# Patient Record
Sex: MALE | Race: WHITE | NOT HISPANIC OR LATINO | URBAN - METROPOLITAN AREA
[De-identification: names, ages, dates, MRNs, and addresses within clinical notes are randomized per-mention and may not be internally consistent; named-entity substitution may affect disease eponyms.]

---

## 2018-06-03 ENCOUNTER — EMERGENCY (EMERGENCY)
Facility: HOSPITAL | Age: 58
LOS: 1 days | Discharge: HOME | End: 2018-06-03
Attending: STUDENT IN AN ORGANIZED HEALTH CARE EDUCATION/TRAINING PROGRAM
Payer: COMMERCIAL

## 2018-06-03 VITALS
HEIGHT: 68 IN | SYSTOLIC BLOOD PRESSURE: 132 MMHG | OXYGEN SATURATION: 92 % | TEMPERATURE: 98 F | HEART RATE: 80 BPM | RESPIRATION RATE: 16 BRPM | DIASTOLIC BLOOD PRESSURE: 93 MMHG | WEIGHT: 179.9 LBS

## 2018-06-03 DIAGNOSIS — Z41.9 ENCOUNTER FOR PROCEDURE FOR PURPOSES OTHER THAN REMEDYING HEALTH STATE, UNSPECIFIED: Chronic | ICD-10-CM

## 2018-06-03 PROCEDURE — 99283 EMERGENCY DEPT VISIT LOW MDM: CPT

## 2018-06-03 PROCEDURE — 93005 ELECTROCARDIOGRAM TRACING: CPT

## 2018-06-03 PROCEDURE — 93010 ELECTROCARDIOGRAM REPORT: CPT

## 2018-06-03 PROCEDURE — 99284 EMERGENCY DEPT VISIT MOD MDM: CPT

## 2018-06-03 RX ORDER — OMEPRAZOLE 10 MG/1
0 CAPSULE, DELAYED RELEASE ORAL
Qty: 0 | Refills: 0 | COMMUNITY

## 2018-06-03 RX ORDER — LOSARTAN POTASSIUM 100 MG/1
1 TABLET, FILM COATED ORAL
Qty: 0 | Refills: 0 | COMMUNITY

## 2018-06-03 NOTE — ED ADULT TRIAGE NOTE - CHIEF COMPLAINT QUOTE
Pt BIBA A&ox3, speaking coherently, and following commands. Pt had recent R shoulder surgery 2 weeks ago and was instructed not to lift anything heavy, pt lifted something, heard a pop felt immense pain and became diaphoretic, dizzy, nauseous. As per wife, pt was in and out of consciousness. Pt does not recall this.  on scene.

## 2018-06-03 NOTE — ED ADULT NURSE NOTE - OBJECTIVE STATEMENT
patient states grabbed ice pack with right hand and had sharp pain to right shoulder. patient states had Bicep shoulder on 5/17 in [facility] and wife states that patient passed out twice due to pain.  post bozena radial pulse  finger warm and mobile. patient able to move all joints good cap refill. patient having right arm in shoulder immobilizer patient having three steri strip site to right shoulder all dry and intact

## 2018-06-03 NOTE — ED ADULT NURSE NOTE - CHPI ED SYMPTOMS NEG
no abrasion/no weakness/no tingling/no difficulty bearing weight/no stiffness/no bruising/no fever/no numbness/no deformity/no back pain

## 2018-06-03 NOTE — ED PROVIDER NOTE - MEDICAL DECISION MAKING DETAILS
discussed obtaining lab work for electrolyte abnormality, however pt's sx are likely due to acute vasovagal, due to painful episode, pt is aware, does not want any labs at this time, would like to go home and follow up with orthopedist tomorrow

## 2018-06-03 NOTE — ED PROVIDER NOTE - OBJECTIVE STATEMENT
56 y/o M, with hx of HTN, GERD, and h/o right shoulder surgery on 5/17/18, presents to the ED c/o 56 y/o M, with hx of HTN, GERD, and h/o right shoulder surgery on 5/17/18, presents to the ED c/o right shoulder pain, onset 7:45PM.  Pt states that he was lifting something with his left hand, when the object began to slip.  Pt states he tried to recover the object with his right arm, when he felt a pop followed by severe pain.  Pt states he began to feel very diaphoretic, dizzy, and nauseous.  As per wife, pt began to go in and out of consciousness, with multiple levels of LOC, lasting approximately 30 seconds each.  Pt states that due to recent surgery, he was not suppose to be lifting anything.  Surgery performed by Dr. Rhiannon Vergara at the [facility].  Denies fever, chills, vomiting, numbness, tingling, or back pain.  PCP: Dr. Black    pcp dr chisholm

## 2018-06-03 NOTE — ED PROVIDER NOTE - MUSCULOSKELETAL, MLM
Spine appears normal, range of motion is not limited, no muscle or joint tenderness.  Extremities x4 normal, no swelling, edema, or calf tenderness.  Right upper extremity has steri strips in place over suture sites.  Steri strips are clean, dry, and intact.  No crepitus, bruising, or hematoma

## 2019-10-16 NOTE — ED ADULT NURSE NOTE - NURSING MUSC STRENGTH
Patient Instructions:  Dr. Amaral recommends returning to a general cardiologist approximately 3 months after your upcoming scheduled CORE appt.  Per your request, you can meet with our  on the day of your CORE appt to make that appt.       It was a pleasure to see you in the cardiology clinic today.    We are encouraging the use of [email or web address] to communicate with your Healthcare Provider.  If you have any questions, call  Agustin Antony LPN, at [phone].  Press Option #1 for the [facility], and then press Option #4 for nursing.  Cardiology Fax  : [phone]      If you have an urgent need after hours (8:00 am to 4:30 pm) please call [phone] and ask for the cardiology fellow on call.    
limitations in strength

## 2022-07-01 NOTE — ED PROVIDER NOTE - CPE EDP NEURO NORM
normal... [place] Counseling:  I discussed with the patient the risks of imiquimod including but not limited to erythema, scaling, itching, weeping, crusting, and pain.  Patient understands that the inflammatory response to imiquimod is variable from person to person and was educated regarded proper titration schedule.  If flu-like symptoms develop, patient knows to discontinue the medication and contact us.